# Patient Record
Sex: MALE | Race: WHITE | NOT HISPANIC OR LATINO | Employment: OTHER | URBAN - NONMETROPOLITAN AREA
[De-identification: names, ages, dates, MRNs, and addresses within clinical notes are randomized per-mention and may not be internally consistent; named-entity substitution may affect disease eponyms.]

---

## 2021-12-14 ENCOUNTER — OFFICE VISIT (OUTPATIENT)
Dept: CARDIOLOGY | Facility: CLINIC | Age: 51
End: 2021-12-14

## 2021-12-14 VITALS
DIASTOLIC BLOOD PRESSURE: 80 MMHG | RESPIRATION RATE: 12 BRPM | TEMPERATURE: 98 F | SYSTOLIC BLOOD PRESSURE: 126 MMHG | OXYGEN SATURATION: 99 % | BODY MASS INDEX: 30.13 KG/M2 | WEIGHT: 192 LBS | HEART RATE: 106 BPM | HEIGHT: 67 IN

## 2021-12-14 DIAGNOSIS — R00.0 TACHYCARDIA: Primary | ICD-10-CM

## 2021-12-14 PROCEDURE — 99203 OFFICE O/P NEW LOW 30 MIN: CPT | Performed by: INTERNAL MEDICINE

## 2021-12-14 RX ORDER — TESTOSTERONE GEL, 1% 10 MG/G
50 GEL TRANSDERMAL DAILY
COMMUNITY
End: 2022-10-12

## 2021-12-14 RX ORDER — TADALAFIL 20 MG/1
40 TABLET ORAL
COMMUNITY
End: 2022-10-12

## 2021-12-14 NOTE — PROGRESS NOTES
[facility] [place]  [facility] CARDIOLOGY  [address] [place] [address]  Dept: [phone]  Dept Fax: [phone]    Ayan Chatman  1970    New Patient Office Note    History of Present Illness:  Ayan Chatman is a 51 y.o. male who presents to the clinic for tachycardia , he is a strong 51 years old with lift weight has noticed for the last 3 months his HR is fast in the 89 to 90, and can not sleep and feels tired and fatigue, he takes Adcirca for the last 3 months and also takes testosterone for long time,  His EKG is normal with Hr 90., here Hr is 100, his cardiac exam is normal, , I think his Adcirca is causing the Hr fast, will  Hold for 2-3 week and will go form there, BP is 120.70     The following portions of the patient's history were reviewed and updated as appropriate: allergies, current medications, past family history, past medical history, past social history, past surgical history and problem list.    Medications:  tadalafil tablet  testosterone gel    Subjective  No Known Allergies     Past Medical History:   Diagnosis Date   • Allergies    • Asthma    • COVID-19        Past Surgical History:   Procedure Laterality Date   • NO PAST SURGERIES         Family History   Problem Relation Age of Onset   • Hearing loss Father         Social History     Socioeconomic History   • Marital status:    Tobacco Use   • Smoking status: Never Smoker   • Smokeless tobacco: Never Used   Vaping Use   • Vaping Use: Never used   Substance and Sexual Activity   • Alcohol use: Not Currently   • Sexual activity: Never       Review of Systems   Constitutional: Negative.    HENT: Negative.    Respiratory: Negative.    Cardiovascular: Negative.    Endocrine: Negative.    Genitourinary: Negative.    Musculoskeletal: Negative.    Skin: Negative.    Allergic/Immunologic: Negative.    Neurological: Negative.    Hematological: Negative.    Psychiatric/Behavioral: Negative.   "      Cardiovascular Procedures    ECHO/MUGA:   STRESS TESTS:   CARDIAC CATH:   DEVICES:   HOLTER:   CT/MRI:   VASCULAR:   CARDIOTHORACIC:     Objective  Vitals:    12/14/21 1415   BP: 126/80   BP Location: Left arm   Patient Position: Lying   Cuff Size: Adult   Pulse: 106   Resp: 12   Temp: 98 °F (36.7 °C)   TempSrc: Infrared   SpO2: 99%   Weight: 87.1 kg (192 lb)   Height: 170.2 cm (67\")   PainSc:   2   PainLoc: Back       Physical Exam  Constitutional:       Appearance: Healthy appearance. Not in distress.   Neck:      Vascular: No JVR. JVD normal.   Pulmonary:      Effort: Pulmonary effort is normal.      Breath sounds: Normal breath sounds. No wheezing. No rhonchi. No rales.   Chest:      Chest wall: Not tender to palpatation.   Cardiovascular:      PMI at left midclavicular line. Normal rate. Regular rhythm. Normal S1. Normal S2.      Murmurs: There is no murmur.      No gallop. No click. No rub.   Pulses:     Intact distal pulses.   Edema:     Peripheral edema absent.   Abdominal:      General: Bowel sounds are normal.      Palpations: Abdomen is soft.      Tenderness: There is no abdominal tenderness.   Musculoskeletal: Normal range of motion.         General: No tenderness. Skin:     General: Skin is warm and dry.   Neurological:      General: No focal deficit present.      Mental Status: Alert and oriented to person, place and time.          Diagnostic Data  Procedures    Assessment and Plan  Diagnoses and all orders for this visit:    Tachycardia- Likely related to Adcirca , will hold for few weeks, he states that he has been on that meds for 3 months for BP ??? , will hold Adcirca     Other orders  -     tadalafil (ADCIRCA) 20 MG tablet tablet; Take 40 mg by mouth Daily.  -     testosterone (ANDROGEL) 50 MG/5GM (1%) gel gel; Place 50 mg on the skin as directed by provider Daily.        Return in about 3 weeks (around 1/4/2022).    Viktor Ye MD  12/14/2021  "

## 2022-10-12 ENCOUNTER — OFFICE VISIT (OUTPATIENT)
Dept: ORTHOPEDIC SURGERY | Facility: CLINIC | Age: 52
End: 2022-10-12

## 2022-10-12 VITALS
SYSTOLIC BLOOD PRESSURE: 110 MMHG | BODY MASS INDEX: 30.14 KG/M2 | WEIGHT: 192.02 LBS | HEIGHT: 67 IN | DIASTOLIC BLOOD PRESSURE: 74 MMHG

## 2022-10-12 DIAGNOSIS — Z02.6 ENCOUNTER RELATED TO WORKER'S COMPENSATION CLAIM: ICD-10-CM

## 2022-10-12 DIAGNOSIS — M25.512 LEFT SHOULDER PAIN, UNSPECIFIED CHRONICITY: Primary | ICD-10-CM

## 2022-10-12 DIAGNOSIS — S46.012A TRAUMATIC INCOMPLETE TEAR OF LEFT ROTATOR CUFF, INITIAL ENCOUNTER: ICD-10-CM

## 2022-10-12 PROCEDURE — 99203 OFFICE O/P NEW LOW 30 MIN: CPT | Performed by: ORTHOPAEDIC SURGERY

## 2022-10-12 PROCEDURE — 20610 DRAIN/INJ JOINT/BURSA W/O US: CPT | Performed by: ORTHOPAEDIC SURGERY

## 2022-10-12 RX ORDER — LIDOCAINE HYDROCHLORIDE 10 MG/ML
4 INJECTION, SOLUTION EPIDURAL; INFILTRATION; INTRACAUDAL; PERINEURAL
Status: COMPLETED | OUTPATIENT
Start: 2022-10-12 | End: 2022-10-12

## 2022-10-12 RX ORDER — TRIAMCINOLONE ACETONIDE 40 MG/ML
40 INJECTION, SUSPENSION INTRA-ARTICULAR; INTRAMUSCULAR
Status: COMPLETED | OUTPATIENT
Start: 2022-10-12 | End: 2022-10-12

## 2022-10-12 RX ORDER — BUPIVACAINE HYDROCHLORIDE 2.5 MG/ML
4 INJECTION, SOLUTION EPIDURAL; INFILTRATION; INTRACAUDAL
Status: COMPLETED | OUTPATIENT
Start: 2022-10-12 | End: 2022-10-12

## 2022-10-12 RX ADMIN — LIDOCAINE HYDROCHLORIDE 4 ML: 10 INJECTION, SOLUTION EPIDURAL; INFILTRATION; INTRACAUDAL; PERINEURAL at 08:12

## 2022-10-12 RX ADMIN — TRIAMCINOLONE ACETONIDE 40 MG: 40 INJECTION, SUSPENSION INTRA-ARTICULAR; INTRAMUSCULAR at 08:12

## 2022-10-12 RX ADMIN — BUPIVACAINE HYDROCHLORIDE 4 ML: 2.5 INJECTION, SOLUTION EPIDURAL; INFILTRATION; INTRACAUDAL at 08:12

## 2022-10-12 NOTE — PROGRESS NOTES
"      [facility] Orthopa[facility] Surgery Clinic Note    Subjective     CC: Pain of the Left Shoulder      HPI    Ayan Chatman is a 51 y.o. male who presents with new problem of: left shoulder pain.  Onset: direct blow. The issue has been ongoing for 2 month(s). Pain is a 3/10 on the pain scale. Pain is described as dull, aching and stabbing. Associated symptoms include pain and popping. The pain is worse with sleeping, working and lying on affected side; resting and ice improve the pain. Previous treatments have included: NSAIDS and physical therapy.    I have reviewed the following portions of the patient's history:History of Present Illness and review of systems.    He injured his left shoulder at work at [employer].  This was on August 22, 2022.  He has been in physical therapy in [place].  He had an MRI September 29    Review of Systems   Constitutional: Negative.  Negative for chills, fatigue and fever.   HENT: Negative.  Negative for congestion and dental problem.    Eyes: Negative.  Negative for blurred vision.   Respiratory: Negative.  Negative for shortness of breath.    Cardiovascular: Negative.  Negative for leg swelling.   Gastrointestinal: Negative.  Negative for abdominal pain.   Endocrine: Negative.  Negative for polyuria.   Genitourinary: Negative.  Negative for difficulty urinating.   Musculoskeletal: Positive for arthralgias.   Skin: Negative.    Allergic/Immunologic: Negative.    Neurological: Negative.    Hematological: Negative.  Negative for adenopathy.   Psychiatric/Behavioral: Negative.  Negative for behavioral problems.       ROS:    Constiutional:Pt denies fever, chills, nausea, or vomiting.  MSK:as above      Objective      Past Medical History  Past Medical History:   Diagnosis Date   • Allergies    • Asthma    • COVID-19          Physical Exam  /74   Ht 170.2 cm (67.01\")   Wt 87.1 kg (192 lb 0.3 oz)   BMI 30.07 kg/m²     Body mass index is 30.07 kg/m².    Patient is well nourished and well " developed.        Ortho Exam  Left shoulder full motion full-strength.  Mildly positive impingement and pain with supraspinatus testing.  Minimal tenderness to proximal biceps tendon    Imaging/Labs/EMG Reviewed:  Imaging Results (Last 24 Hours)     Procedure Component Value Units Date/Time    XR Shoulder 2+ View Left [[id number]] Resulted: 10/12/22 0806     Updated: 10/12/22 0807    Narrative:      Left Shoulder X-Ray  Indication: Pain  AP, scapular Y, and axillary lateral views    Findings: Small osteophyte off the inferior glenoid and osteophytes at AC   joint  No fracture  No prior studies were available for comparison.        I have his MRI arthrogram report from September 29 was describes a mild partial-thickness supraspinatus tear    Assessment:  1. Left shoulder pain, unspecified chronicity    2. Encounter related to worker's compensation claim    3. Traumatic incomplete tear of left rotator cuff, initial encounter        Plan:  Recommend over the counter anti-inflammatories for pain and/or swelling  We discussed different treatment options and elected to proceed with cortisone injection.  I injected his left shoulder subacromial space with cortisone.  He tolerated the injection well.  He will continue to work full duty    Follow Up:   No follow-ups on file.      Medical Decision Making  Management Options : Low - 1 Acute, Uncomplicated illness or injury , OTC Drugs and OT or PT Therapy         Kody Gallego M.D., FAAOS  Orthopedic Surgeon  Fellowship Trained Sports Medicine  [facility]  Orthopedics and Sports Medicine  [address] [place] [address], [address]  [place] [address]    EMR Dragon/Transcription disclaimer:  Much of this encounter note is an electronic transcription of spoken language to printed text. Electronic transcription of spoken language may permit erroneous, or at times, nonsensical words or phrases to be inadvertently transcribed. Although I have reviewed the note for such  errors, some may still exist.

## 2022-10-12 NOTE — PROGRESS NOTES
Procedure   Large Joint Arthrocentesis: L subacromial bursa  Date/Time: 10/12/2022 8:12 AM  Consent given by: patient  Site marked: site marked  Timeout: Immediately prior to procedure a time out was called to verify the correct patient, procedure, equipment, support staff and site/side marked as required   Supporting Documentation  Indications: pain   Procedure Details  Location: shoulder - L subacromial bursa  Preparation: Patient was prepped and draped in the usual sterile fashion  Needle size: 22 G  Approach: posterior  Medications administered: 4 mL bupivacaine (PF) 0.25 %; 4 mL lidocaine PF 1% 1 %; 40 mg triamcinolone acetonide 40 MG/ML  Patient tolerance: patient tolerated the procedure well with no immediate complications

## 2025-05-07 ENCOUNTER — OFFICE VISIT (OUTPATIENT)
Dept: FAMILY MEDICINE CLINIC | Facility: CLINIC | Age: 55
End: 2025-05-07
Payer: COMMERCIAL

## 2025-05-07 VITALS
SYSTOLIC BLOOD PRESSURE: 122 MMHG | HEIGHT: 67 IN | OXYGEN SATURATION: 97 % | HEART RATE: 64 BPM | WEIGHT: 191 LBS | DIASTOLIC BLOOD PRESSURE: 84 MMHG | BODY MASS INDEX: 29.98 KG/M2

## 2025-05-07 DIAGNOSIS — R07.81 RIB PAIN ON LEFT SIDE: Primary | ICD-10-CM

## 2025-05-07 PROCEDURE — 99203 OFFICE O/P NEW LOW 30 MIN: CPT | Performed by: PHYSICIAN ASSISTANT

## 2025-05-07 RX ORDER — UBIDECARENONE 100 MG
100 CAPSULE ORAL DAILY
COMMUNITY

## 2025-05-07 RX ORDER — NEBIVOLOL 5 MG/1
5 TABLET ORAL DAILY
COMMUNITY

## 2025-05-07 RX ORDER — CHLORAL HYDRATE 500 MG
CAPSULE ORAL
COMMUNITY

## 2025-05-07 NOTE — PROGRESS NOTES
".Chief Complaint  Establish Care and Rib Pain (Pt reports sneezing very hard yesterday and is now having lt sided rib pain, tender to touch and hurts when taking deep breath or coughing or sneezing. )    Subjective          History of Present Illness  Ayan Chatman is here today with his  History of Present Illness  The patient presents for evaluation of rib pain. He had been a patient in our practice more than three years ago. He states that he had established with another PCP in [facility] but knows that they do not have an Xray so reestablished with us. He states that he sees [facility] medicine in [place] and can have his labs drawn there any time and declines to have labs or other preventative care with our office.     He reports experiencing severe pain in his rib cage, which he attributes to a forceful sneeze that occurred while he was at work yesterday. The sneeze was likely triggered by his seasonal allergies. He describes the pain as intense, particularly when he expands his diaphragm through actions such as sneezing or coughing. The area is also extremely sensitive to touch. He has not sought medical attention for this issue and has not taken any medication for it.  He expresses concern about the potential impact of this injury on his physically demanding job.        Objective   Vital Signs:   /84   Pulse 64   Ht 170.2 cm (67\")   Wt 86.6 kg (191 lb)   SpO2 97%   BMI 29.91 kg/m²     Body mass index is 29.91 kg/m².  BMI is >= 25 and <30. (Overweight) The following options were offered after discussion;: information on healthy weight added to patient's after visit summary       Review of Systems      Current Outpatient Medications:   •  B Complex-C (VITAMIN B + C COMPLEX PO), Take  by mouth., Disp: , Rfl:   •  coenzyme Q10 100 MG capsule, Take 1 capsule by mouth Daily., Disp: , Rfl:   •  GLUTATHIONE PO, Take  by mouth., Disp: , Rfl:   •  MAGNESIUM PO, Take  by mouth., Disp: , Rfl:   •  Menaquinone-7 " (VITAMIN K2 PO), Take  by mouth., Disp: , Rfl:   •  Multiple Vitamins-Minerals (ZINC PO), Take  by mouth., Disp: , Rfl:   •  nebivolol (BYSTOLIC) 5 MG tablet, Take 1 tablet by mouth Daily., Disp: , Rfl:   •  NIACIN PO, Take  by mouth., Disp: , Rfl:   •  Omega-3 Fatty Acids (fish oil) 1000 MG capsule capsule, Take  by mouth Daily With Breakfast., Disp: , Rfl:   •  Probiotic Product (PROBIOTIC DAILY PO), Take  by mouth., Disp: , Rfl:   •  SELENIUM PO, Take  by mouth., Disp: , Rfl:   •  Testosterone Enanthate 200 MG/ML solution, , Disp: , Rfl:   •  VANADYL SULFATE PO, Take  by mouth., Disp: , Rfl:   •  VITAMIN A PO, Take  by mouth., Disp: , Rfl:   •  vitamin D3 125 MCG (5000 UT) capsule capsule, Take 1 capsule by mouth Daily., Disp: , Rfl:   •  UBIQUINOL PO, Take  by mouth., Disp: , Rfl:     Allergies: Patient has no known allergies.    Physical Exam  Vitals and nursing note reviewed.   Constitutional:       General: He is not in acute distress.     Appearance: Normal appearance. He is not ill-appearing or toxic-appearing.   HENT:      Head: Normocephalic and atraumatic.   Pulmonary:      Effort: Pulmonary effort is normal.   Abdominal:       Neurological:      Mental Status: He is alert. Mental status is at baseline.   Psychiatric:         Mood and Affect: Mood normal.      Physical Exam      Result Review :          Results               Assessment and Plan    Diagnoses and all orders for this visit:    1. Rib pain on left side (Primary)  -     XR Ribs Left With PA Chest      Assessment & Plan    - The clinical presentation suggests a potential muscle strain rather than a rib fracture.  - Physical examination reveals tenderness in the rib cage area, with pain exacerbated by diaphragm expansion.  - An x-ray will be conducted to rule out any rib fractures.  - Advised to take 1 to 2 tablets of Aleve daily for a week to manage pain and inflammation. A note for work leave has been provided due to the physical nature of  his job.      Follow Up   No follow-ups on file.  Patient was given instructions and counseling regarding his condition or for health maintenance advice. Please see specific information pulled into the AVS if appropriate.     Patient or patient representative verbalized consent for the use of Ambient Listening during the visit with  SAURABH Wan for chart documentation. 5/7/2025  13:01 EDT    SAURABH Wan  05/07/2025

## 2025-05-07 NOTE — LETTER
May 7, 2025     Patient: Ayan Chatman   YOB: 1970   Date of Visit: 5/7/2025       To Whom it May Concern:    Ayan Chatman was seen in my clinic on 5/7/2025. He may return to work in three days.         Sincerely,          Eneida Calloway PA-C        CC: No Recipients

## 2025-07-07 DIAGNOSIS — R41.840 ATTENTION OR CONCENTRATION DEFICIT: Primary | ICD-10-CM

## 2025-08-05 ENCOUNTER — OFFICE VISIT (OUTPATIENT)
Age: 55
End: 2025-08-05
Payer: COMMERCIAL

## 2025-08-05 VITALS
DIASTOLIC BLOOD PRESSURE: 86 MMHG | WEIGHT: 191 LBS | HEART RATE: 98 BPM | HEIGHT: 67 IN | SYSTOLIC BLOOD PRESSURE: 140 MMHG | OXYGEN SATURATION: 98 % | BODY MASS INDEX: 29.98 KG/M2

## 2025-08-05 DIAGNOSIS — F90.2 ATTENTION DEFICIT HYPERACTIVITY DISORDER, COMBINED TYPE: Primary | ICD-10-CM

## 2025-08-05 PROBLEM — I10 ESSENTIAL HYPERTENSION: Status: ACTIVE | Noted: 2025-03-14

## 2025-08-05 LAB
AMPHET+METHAMPHET UR QL: NEGATIVE
AMPHETAMINE INTERNAL CONTROL: NORMAL
AMPHETAMINES UR QL: NEGATIVE
BARBITURATE INTERNAL CONTROL: NORMAL
BARBITURATES UR QL SCN: NEGATIVE
BENZODIAZ UR QL SCN: NEGATIVE
BENZODIAZEPINE INTERNAL CONTROL: NORMAL
BUPRENORPHINE INTERNAL CONTROL: NORMAL
BUPRENORPHINE SERPL-MCNC: NEGATIVE NG/ML
CANNABINOIDS SERPL QL: NEGATIVE
COCAINE INTERNAL CONTROL: NORMAL
COCAINE UR QL: NEGATIVE
EDDP INTERNAL CONTROL: NORMAL
EDDP UR QL SCN: NEGATIVE
MDMA (ECSTASY) INTERNAL CONTROL: NORMAL
MDMA UR QL SCN: NEGATIVE
METHADONE INTERNAL CONTROL: NORMAL
METHADONE UR QL SCN: NEGATIVE
METHAMPHETAMINE INTERNAL CONTROL: NORMAL
MORPHINE INTERNAL CONTROL: NORMAL
MORPHINE UR QL SCN: NEGATIVE
OXYCODONE INTERNAL CONTROL: NORMAL
OXYCODONE UR QL SCN: NEGATIVE
PCP UR QL SCN: NEGATIVE
PHENCYCLIDINE INTERNAL CONTROL: NORMAL
PROPOXYPH UR QL SCN: NEGATIVE
PROPOXYPHENE INTERNAL CONTROL: NORMAL
THC INTERNAL CONTROL: NORMAL
TRICYCLIC ANTIDEPRESSANTS INTERNAL CONTROL: NORMAL
TRICYCLICS UR QL SCN: NEGATIVE

## 2025-08-05 RX ORDER — METHYLPHENIDATE HYDROCHLORIDE 10 MG/1
10 TABLET ORAL 2 TIMES DAILY
Qty: 60 TABLET | Refills: 0 | Status: SHIPPED | OUTPATIENT
Start: 2025-08-05 | End: 2025-09-04